# Patient Record
Sex: MALE | Race: WHITE
[De-identification: names, ages, dates, MRNs, and addresses within clinical notes are randomized per-mention and may not be internally consistent; named-entity substitution may affect disease eponyms.]

---

## 2022-12-23 ENCOUNTER — HOSPITAL ENCOUNTER (OUTPATIENT)
Dept: HOSPITAL 47 - RADMRIMAIN | Age: 57
Discharge: HOME | End: 2022-12-23
Attending: ORTHOPAEDIC SURGERY
Payer: COMMERCIAL

## 2022-12-23 DIAGNOSIS — M19.011: Primary | ICD-10-CM

## 2022-12-23 DIAGNOSIS — M75.51: ICD-10-CM

## 2022-12-24 NOTE — MR
EXAMINATION TYPE: MR shoulder RT wo con

 

DATE OF EXAM: 12/23/2022

 

COMPARISON: Outside right shoulder x-ray December 14, 2022

 

HISTORY: Right shoulder pain, decreased ROM, fell 2 months ago.

 

TECHNIQUE: Multiplanar, multisequence imaging of the right shoulder is performed without contrast.

 

FINDINGS:

 

Rotator Cuff: Distal supraspinatus and infraspinatus tendons are intact. Rotator cuff muscle bulk is 
preserved.

 

Acromioclavicular Joint: Mild/moderate narrowing and spurring. Moderate capsular hypertrophy. Loss of
 underlying fat plane noted seen best sagittal image 17.

 

Glenohumeral Joint: Small to moderate-sized joint effusion. No significant spurring.

 

Labrum: The labrum appears grossly intact given limitation of non-arthrogram study.

 

Biceps Tendon: The long head of biceps is in normal location within bicipital groove.

 

Bone marrow signal: Subchondral cystic change involving the superior humeral head.

 

Other: Increased fluid signal subdeltoid/subacromial bursa.

 

IMPRESSION:

1. Degenerative changes in right shoulder as detailed above. No rotator cuff or labral tear is seen. 
Subdeltoid/subacromial bursitis is also present.